# Patient Record
Sex: FEMALE | Race: OTHER | HISPANIC OR LATINO | URBAN - METROPOLITAN AREA
[De-identification: names, ages, dates, MRNs, and addresses within clinical notes are randomized per-mention and may not be internally consistent; named-entity substitution may affect disease eponyms.]

---

## 2017-02-01 ENCOUNTER — EMERGENCY (EMERGENCY)
Facility: HOSPITAL | Age: 22
LOS: 1 days | Discharge: HOME | End: 2017-02-01
Attending: EMERGENCY MEDICINE
Payer: SELF-PAY

## 2017-02-01 VITALS
TEMPERATURE: 98 F | SYSTOLIC BLOOD PRESSURE: 103 MMHG | WEIGHT: 104.94 LBS | HEIGHT: 60 IN | DIASTOLIC BLOOD PRESSURE: 60 MMHG | OXYGEN SATURATION: 100 % | HEART RATE: 82 BPM | RESPIRATION RATE: 17 BRPM

## 2017-02-01 DIAGNOSIS — S39.012A STRAIN OF MUSCLE, FASCIA AND TENDON OF LOWER BACK, INITIAL ENCOUNTER: ICD-10-CM
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
## 2017-02-01 DIAGNOSIS — R11.2 NAUSEA WITH VOMITING, UNSPECIFIED: ICD-10-CM

## 2017-02-01 PROCEDURE — 99283 EMERGENCY DEPT VISIT LOW MDM: CPT

## 2017-02-01 RX ORDER — IBUPROFEN 200 MG
600 TABLET ORAL ONCE
Qty: 0 | Refills: 0 | Status: COMPLETED | OUTPATIENT
Start: 2017-02-01 | End: 2017-02-01

## 2017-02-01 RX ORDER — IBUPROFEN 200 MG
1 TABLET ORAL
Qty: 20 | Refills: 0 | OUTPATIENT
Start: 2017-02-01 | End: 2017-02-06

## 2017-02-01 RX ORDER — METHOCARBAMOL 500 MG/1
1 TABLET, FILM COATED ORAL
Qty: 12 | Refills: 0 | OUTPATIENT
Start: 2017-02-01 | End: 2017-02-05

## 2017-02-01 RX ORDER — METHOCARBAMOL 500 MG/1
1500 TABLET, FILM COATED ORAL ONCE
Qty: 0 | Refills: 0 | Status: COMPLETED | OUTPATIENT
Start: 2017-02-01 | End: 2017-02-01

## 2017-02-01 RX ADMIN — Medication 600 MILLIGRAM(S): at 13:17

## 2017-02-01 RX ADMIN — METHOCARBAMOL 1500 MILLIGRAM(S): 500 TABLET, FILM COATED ORAL at 13:17
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
## 2017-02-01 NOTE — ED PROVIDER NOTE - NS ED MD SCRIBE ATTENDING SCRIBE SECTIONS
REVIEW OF SYSTEMS/DISPOSITION/VITAL SIGNS( Pullset)/HISTORY OF PRESENT ILLNESS/PAST MEDICAL/SURGICAL/SOCIAL HISTORY/HIV/PHYSICAL EXAM
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
## 2017-02-01 NOTE — ED PROVIDER NOTE - PHYSICAL EXAMINATION
Back is symmetrical, scapulae are symmetric. Neck has full range of motion. No spinal or paraspinal deformity or step-offs. All limbs equally strong 5+ bilaterally. Strong ankle dorsiflexion and plantar flexion against resistance bilaterally. Strong flexion/extension of big toe bilaterally. Pt is able to walk in straight line with steady gait. No recent weight loss or night sweats. No saddle anesthesia, no bowel/bladder incontinence or retention, no lower extremity weakness.   + R sided mid back latissimus dorsi point ttp.

## 2017-02-01 NOTE — ED PROVIDER NOTE - PROGRESS NOTE DETAILS
History and findings suggestive of muscle strain of the midback. Will give Rx for IBU and Robaxin. Patient will need to follow up with PMD. Care coordinator will assist. Pt is well appearing walking with steady gait, stable for discharge and follow up without fail with medical doctor. I had a detailed discussion with the patient and/or guardian regarding the historical points, exam findings, and any diagnostic results supporting the discharge diagnosis. Pt educated on care and need for follow up. Strict return instructions and red flag signs and symptoms discussed with patient. Questions answered. Pt shows understanding of discharge information and agrees to follow. The scribe's documentation has been prepared under my direction and personally reviewed by me in its entirety. I confirm that the note above actually reflects all work, treatment, procedures, and medical decision-making performed by me - ALANNA Medrano

## 2018-07-24 ENCOUNTER — EMERGENCY (EMERGENCY)
Facility: HOSPITAL | Age: 23
LOS: 1 days | Discharge: HOME | End: 2018-07-24
Attending: EMERGENCY MEDICINE | Admitting: EMERGENCY MEDICINE
Payer: MEDICAID

## 2018-07-24 VITALS
RESPIRATION RATE: 16 BRPM | TEMPERATURE: 98 F | SYSTOLIC BLOOD PRESSURE: 104 MMHG | OXYGEN SATURATION: 99 % | HEART RATE: 98 BPM | DIASTOLIC BLOOD PRESSURE: 62 MMHG

## 2018-07-24 LAB
ALBUMIN SERPL ELPH-MCNC: 3.9 G/DL — SIGNIFICANT CHANGE UP (ref 3.3–5)
ALP SERPL-CCNC: 66 U/L — SIGNIFICANT CHANGE UP (ref 40–120)
ALT FLD-CCNC: 20 U/L — SIGNIFICANT CHANGE UP (ref 4–33)
APTT BLD: 37 SEC — SIGNIFICANT CHANGE UP (ref 27.5–37.4)
AST SERPL-CCNC: 27 U/L — SIGNIFICANT CHANGE UP (ref 4–32)
B PERT DNA SPEC QL NAA+PROBE: SIGNIFICANT CHANGE UP
BASOPHILS # BLD AUTO: 0.01 K/UL — SIGNIFICANT CHANGE UP (ref 0–0.2)
BASOPHILS # BLD AUTO: 0.01 K/UL — SIGNIFICANT CHANGE UP (ref 0–0.2)
BASOPHILS NFR BLD AUTO: 0.3 % — SIGNIFICANT CHANGE UP (ref 0–2)
BASOPHILS NFR BLD AUTO: 0.5 % — SIGNIFICANT CHANGE UP (ref 0–2)
BASOPHILS NFR SPEC: 0 % — SIGNIFICANT CHANGE UP (ref 0–2)
BILIRUB SERPL-MCNC: 0.2 MG/DL — SIGNIFICANT CHANGE UP (ref 0.2–1.2)
BLASTS # FLD: 0 % — SIGNIFICANT CHANGE UP (ref 0–0)
BUN SERPL-MCNC: 10 MG/DL — SIGNIFICANT CHANGE UP (ref 7–23)
C PNEUM DNA SPEC QL NAA+PROBE: NOT DETECTED — SIGNIFICANT CHANGE UP
CALCIUM SERPL-MCNC: 8.5 MG/DL — SIGNIFICANT CHANGE UP (ref 8.4–10.5)
CHLORIDE SERPL-SCNC: 103 MMOL/L — SIGNIFICANT CHANGE UP (ref 98–107)
CO2 SERPL-SCNC: 25 MMOL/L — SIGNIFICANT CHANGE UP (ref 22–31)
CREAT SERPL-MCNC: 0.66 MG/DL — SIGNIFICANT CHANGE UP (ref 0.5–1.3)
D DIMER BLD IA.RAPID-MCNC: 204 NG/ML — SIGNIFICANT CHANGE UP
EOSINOPHIL # BLD AUTO: 0.03 K/UL — SIGNIFICANT CHANGE UP (ref 0–0.5)
EOSINOPHIL # BLD AUTO: 0.05 K/UL — SIGNIFICANT CHANGE UP (ref 0–0.5)
EOSINOPHIL NFR BLD AUTO: 1.6 % — SIGNIFICANT CHANGE UP (ref 0–6)
EOSINOPHIL NFR BLD AUTO: 1.7 % — SIGNIFICANT CHANGE UP (ref 0–6)
EOSINOPHIL NFR FLD: 0 % — SIGNIFICANT CHANGE UP (ref 0–6)
FIBRINOGEN PPP-MCNC: 429 MG/DL — SIGNIFICANT CHANGE UP (ref 310–510)
FLUAV H1 2009 PAND RNA SPEC QL NAA+PROBE: NOT DETECTED — SIGNIFICANT CHANGE UP
FLUAV H1 RNA SPEC QL NAA+PROBE: NOT DETECTED — SIGNIFICANT CHANGE UP
FLUAV H3 RNA SPEC QL NAA+PROBE: NOT DETECTED — SIGNIFICANT CHANGE UP
FLUAV SUBTYP SPEC NAA+PROBE: SIGNIFICANT CHANGE UP
FLUBV RNA SPEC QL NAA+PROBE: NOT DETECTED — SIGNIFICANT CHANGE UP
GIANT PLATELETS BLD QL SMEAR: PRESENT — SIGNIFICANT CHANGE UP
GLUCOSE SERPL-MCNC: 87 MG/DL — SIGNIFICANT CHANGE UP (ref 70–99)
HADV DNA SPEC QL NAA+PROBE: NOT DETECTED — SIGNIFICANT CHANGE UP
HBA1C BLD-MCNC: 4.8 % — SIGNIFICANT CHANGE UP (ref 4–5.6)
HCOV 229E RNA SPEC QL NAA+PROBE: NOT DETECTED — SIGNIFICANT CHANGE UP
HCOV HKU1 RNA SPEC QL NAA+PROBE: NOT DETECTED — SIGNIFICANT CHANGE UP
HCOV NL63 RNA SPEC QL NAA+PROBE: NOT DETECTED — SIGNIFICANT CHANGE UP
HCOV OC43 RNA SPEC QL NAA+PROBE: NOT DETECTED — SIGNIFICANT CHANGE UP
HCT VFR BLD CALC: 36.3 % — SIGNIFICANT CHANGE UP (ref 34.5–45)
HCT VFR BLD CALC: 37.3 % — SIGNIFICANT CHANGE UP (ref 34.5–45)
HGB BLD-MCNC: 12.3 G/DL — SIGNIFICANT CHANGE UP (ref 11.5–15.5)
HGB BLD-MCNC: 12.8 G/DL — SIGNIFICANT CHANGE UP (ref 11.5–15.5)
HIV COMBO RESULT: SIGNIFICANT CHANGE UP
HIV1+2 AB SPEC QL: SIGNIFICANT CHANGE UP
HMPV RNA SPEC QL NAA+PROBE: NOT DETECTED — SIGNIFICANT CHANGE UP
HPIV1 RNA SPEC QL NAA+PROBE: NOT DETECTED — SIGNIFICANT CHANGE UP
HPIV2 RNA SPEC QL NAA+PROBE: NOT DETECTED — SIGNIFICANT CHANGE UP
HPIV3 RNA SPEC QL NAA+PROBE: NOT DETECTED — SIGNIFICANT CHANGE UP
HPIV4 RNA SPEC QL NAA+PROBE: NOT DETECTED — SIGNIFICANT CHANGE UP
IMM GRANULOCYTES # BLD AUTO: 0.01 # — SIGNIFICANT CHANGE UP
IMM GRANULOCYTES # BLD AUTO: 0.01 # — SIGNIFICANT CHANGE UP
IMM GRANULOCYTES NFR BLD AUTO: 0.3 % — SIGNIFICANT CHANGE UP (ref 0–1.5)
IMM GRANULOCYTES NFR BLD AUTO: 0.5 % — SIGNIFICANT CHANGE UP (ref 0–1.5)
INR BLD: 1.03 — SIGNIFICANT CHANGE UP (ref 0.88–1.17)
LDH SERPL L TO P-CCNC: 147 U/L — SIGNIFICANT CHANGE UP (ref 135–225)
LYMPHOCYTES # BLD AUTO: 0.38 K/UL — LOW (ref 1–3.3)
LYMPHOCYTES # BLD AUTO: 0.42 K/UL — LOW (ref 1–3.3)
LYMPHOCYTES # BLD AUTO: 12.8 % — LOW (ref 13–44)
LYMPHOCYTES # BLD AUTO: 23.1 % — SIGNIFICANT CHANGE UP (ref 13–44)
LYMPHOCYTES NFR SPEC AUTO: 6.1 % — LOW (ref 13–44)
M PNEUMO DNA SPEC QL NAA+PROBE: NOT DETECTED — SIGNIFICANT CHANGE UP
MACROCYTES BLD QL: SLIGHT — SIGNIFICANT CHANGE UP
MCHC RBC-ENTMCNC: 30.1 PG — SIGNIFICANT CHANGE UP (ref 27–34)
MCHC RBC-ENTMCNC: 30.6 PG — SIGNIFICANT CHANGE UP (ref 27–34)
MCHC RBC-ENTMCNC: 33.9 % — SIGNIFICANT CHANGE UP (ref 32–36)
MCHC RBC-ENTMCNC: 34.3 % — SIGNIFICANT CHANGE UP (ref 32–36)
MCV RBC AUTO: 88.8 FL — SIGNIFICANT CHANGE UP (ref 80–100)
MCV RBC AUTO: 89.2 FL — SIGNIFICANT CHANGE UP (ref 80–100)
METAMYELOCYTES # FLD: 0 % — SIGNIFICANT CHANGE UP (ref 0–1)
MICROCYTES BLD QL: SLIGHT — SIGNIFICANT CHANGE UP
MONOCYTES # BLD AUTO: 0.25 K/UL — SIGNIFICANT CHANGE UP (ref 0–0.9)
MONOCYTES # BLD AUTO: 0.46 K/UL — SIGNIFICANT CHANGE UP (ref 0–0.9)
MONOCYTES NFR BLD AUTO: 13.7 % — SIGNIFICANT CHANGE UP (ref 2–14)
MONOCYTES NFR BLD AUTO: 15.4 % — HIGH (ref 2–14)
MONOCYTES NFR BLD: 5.3 % — SIGNIFICANT CHANGE UP (ref 2–9)
MYELOCYTES NFR BLD: 0 % — SIGNIFICANT CHANGE UP (ref 0–0)
NEUTROPHIL AB SER-ACNC: 77.2 % — HIGH (ref 43–77)
NEUTROPHILS # BLD AUTO: 1.1 K/UL — LOW (ref 1.8–7.4)
NEUTROPHILS # BLD AUTO: 2.07 K/UL — SIGNIFICANT CHANGE UP (ref 1.8–7.4)
NEUTROPHILS NFR BLD AUTO: 60.6 % — SIGNIFICANT CHANGE UP (ref 43–77)
NEUTROPHILS NFR BLD AUTO: 69.5 % — SIGNIFICANT CHANGE UP (ref 43–77)
NEUTS BAND # BLD: 3.5 % — SIGNIFICANT CHANGE UP (ref 0–6)
NRBC # FLD: 0 — SIGNIFICANT CHANGE UP
NRBC # FLD: 0 — SIGNIFICANT CHANGE UP
OTHER - HEMATOLOGY %: 0 — SIGNIFICANT CHANGE UP
PLATELET # BLD AUTO: 86 K/UL — LOW (ref 150–400)
PLATELET # BLD AUTO: 93 K/UL — LOW (ref 150–400)
PLATELET COUNT - ESTIMATE: SIGNIFICANT CHANGE UP
PMV BLD: 10.6 FL — SIGNIFICANT CHANGE UP (ref 7–13)
PMV BLD: 10.8 FL — SIGNIFICANT CHANGE UP (ref 7–13)
POTASSIUM SERPL-MCNC: 3.9 MMOL/L — SIGNIFICANT CHANGE UP (ref 3.5–5.3)
POTASSIUM SERPL-SCNC: 3.9 MMOL/L — SIGNIFICANT CHANGE UP (ref 3.5–5.3)
PROMYELOCYTES # FLD: 0 % — SIGNIFICANT CHANGE UP (ref 0–0)
PROT SERPL-MCNC: 6.9 G/DL — SIGNIFICANT CHANGE UP (ref 6–8.3)
PROTHROM AB SERPL-ACNC: 11.9 SEC — SIGNIFICANT CHANGE UP (ref 9.8–13.1)
RBC # BLD: 4.09 M/UL — SIGNIFICANT CHANGE UP (ref 3.8–5.2)
RBC # BLD: 4.18 M/UL — SIGNIFICANT CHANGE UP (ref 3.8–5.2)
RBC # FLD: 11.7 % — SIGNIFICANT CHANGE UP (ref 10.3–14.5)
RBC # FLD: 11.7 % — SIGNIFICANT CHANGE UP (ref 10.3–14.5)
RSV RNA SPEC QL NAA+PROBE: NOT DETECTED — SIGNIFICANT CHANGE UP
RV+EV RNA SPEC QL NAA+PROBE: NOT DETECTED — SIGNIFICANT CHANGE UP
SMUDGE CELLS # BLD: PRESENT — SIGNIFICANT CHANGE UP
SODIUM SERPL-SCNC: 139 MMOL/L — SIGNIFICANT CHANGE UP (ref 135–145)
VARIANT LYMPHS # BLD: 6.1 % — SIGNIFICANT CHANGE UP
WBC # BLD: 1.82 K/UL — LOW (ref 3.8–10.5)
WBC # BLD: 2.98 K/UL — LOW (ref 3.8–10.5)
WBC # FLD AUTO: 1.82 K/UL — LOW (ref 3.8–10.5)
WBC # FLD AUTO: 2.98 K/UL — LOW (ref 3.8–10.5)

## 2018-07-24 PROCEDURE — 99283 EMERGENCY DEPT VISIT LOW MDM: CPT | Mod: GC

## 2018-07-24 PROCEDURE — 99219: CPT

## 2018-07-24 RX ORDER — ACETAMINOPHEN 500 MG
975 TABLET ORAL ONCE
Qty: 0 | Refills: 0 | Status: COMPLETED | OUTPATIENT
Start: 2018-07-24 | End: 2018-07-24

## 2018-07-24 RX ORDER — SODIUM CHLORIDE 9 MG/ML
1000 INJECTION INTRAMUSCULAR; INTRAVENOUS; SUBCUTANEOUS
Qty: 0 | Refills: 0 | Status: DISCONTINUED | OUTPATIENT
Start: 2018-07-24 | End: 2018-07-28

## 2018-07-24 RX ORDER — METOCLOPRAMIDE HCL 10 MG
10 TABLET ORAL ONCE
Qty: 0 | Refills: 0 | Status: COMPLETED | OUTPATIENT
Start: 2018-07-24 | End: 2018-07-24

## 2018-07-24 RX ORDER — KETOROLAC TROMETHAMINE 30 MG/ML
15 SYRINGE (ML) INJECTION ONCE
Qty: 0 | Refills: 0 | Status: DISCONTINUED | OUTPATIENT
Start: 2018-07-24 | End: 2018-07-24

## 2018-07-24 RX ORDER — ACETAMINOPHEN 500 MG
650 TABLET ORAL ONCE
Qty: 0 | Refills: 0 | Status: COMPLETED | OUTPATIENT
Start: 2018-07-24 | End: 2018-07-24

## 2018-07-24 RX ORDER — SODIUM CHLORIDE 9 MG/ML
1000 INJECTION INTRAMUSCULAR; INTRAVENOUS; SUBCUTANEOUS ONCE
Qty: 0 | Refills: 0 | Status: COMPLETED | OUTPATIENT
Start: 2018-07-24 | End: 2018-07-24

## 2018-07-24 RX ADMIN — Medication 10 MILLIGRAM(S): at 06:51

## 2018-07-24 RX ADMIN — SODIUM CHLORIDE 2000 MILLILITER(S): 9 INJECTION INTRAMUSCULAR; INTRAVENOUS; SUBCUTANEOUS at 06:51

## 2018-07-24 RX ADMIN — SODIUM CHLORIDE 125 MILLILITER(S): 9 INJECTION INTRAMUSCULAR; INTRAVENOUS; SUBCUTANEOUS at 23:59

## 2018-07-24 RX ADMIN — Medication 650 MILLIGRAM(S): at 17:21

## 2018-07-24 RX ADMIN — Medication 15 MILLIGRAM(S): at 07:09

## 2018-07-24 RX ADMIN — Medication 15 MILLIGRAM(S): at 09:44

## 2018-07-24 RX ADMIN — Medication 100 MILLIGRAM(S): at 15:36

## 2018-07-24 RX ADMIN — Medication 650 MILLIGRAM(S): at 18:07

## 2018-07-24 RX ADMIN — Medication 975 MILLIGRAM(S): at 06:51

## 2018-07-24 NOTE — CONSULT NOTE ADULT - SUBJECTIVE AND OBJECTIVE BOX
Pt is 23 YO F who presents with 2 days of intermittent right sided throbbing headache, neck pain, and fever/chills in the setting of insect bite 3 days ago, found to have leukopenia and thrombocytopenia.    HPI: Pt is 23 YO F with PMHx of Coxiella pneumonia at age 2 requiring 1 week hospital admission, Hep A at age 10 requiring 1 week hospital admission, and episode of vertigo at age 15 requiring 3 day hospital admission, who presents with 2 days of intermittent throbbing right sided frontal headaches that awake her at night associated with intermittent neck pain  and fever/chills in the setting of insect bite 3 days ago. Pt states that there are a lot of mosquitos at work and 3 days ago she noticed an insect bite on her leg  that was pruritic but not painful, she did not see the insect that bit her. She then started developing intermittent right sided frontal headaches associated with neck pain and fever/chills that would wake her from sleep. Her mother noticed that her head felt warm and brought her to the ED. The pt denies photophobia, change in vision or hearing, sore throat, runny nose, sore throat, cough, dizziness myalgia, arthralgia, nausea, vomiting, diarrhea, anorexia, recent travel, sick contacts,  rash other than the insect bite, shortness of breath, chest pain, abd pain.   In the ED, pts headache improved, denies active neck pain or fever/chills. She is afebrile TMax=98, and hemodynamically stable JM=515/62, HR=98, R=16, and 99% sat on RA. She appears well, non-toxic. There is a single papule on an erythematous base that is no painful and no longer pruritic, without evidence of pus or cellulitis.   Labs showed WBC = 2.93, and platelets=93   with Leukopenia and Thrombocytopenia in the setting of recent insect bite.       PAST MEDICAL & SURGICAL HISTORY:  Coxiella at age 2 per mother  Hep A at age 10  Vertigo at age 15    No significant past surgical history    No Known Drug Allergies    Social Hx  Lives at home with mom  she denies etoh, smoking cigarettes, drug use  she is sexually active with men, on birth control with non-consistent condom use. States that she was last tested for STI and HIV in 10/17, all were negative  she works as a  and attends school  No recent travel or sick contacts    FAMILY HISTORY:  No pertinent family history in first degree relatives        ROS      General:  +fever/chills    Skin: Insect bite on right medial shin    HEENT: negative except as above    Respiratory and Thorax: denies shortness of breath or cough    Cardiovascular: denies chest pain	    Gastrointestinal: denies nausea, vomiting, abd pain, 	    Genitourinary: denies dysuria, urgency, frequency    Musculoskeletal:	 denies arthralgia or myalgias    Neurological:	denies focal weakness or change in sensation    Psych: Normal affect  	  PHYSICAL EXAM:    Vital Signs Last 24 Hrs  T(C): 36.7 (24 Jul 2018 11:21), Max: 36.9 (24 Jul 2018 05:13)  T(F): 98.1 (24 Jul 2018 11:21), Max: 98.5 (24 Jul 2018 05:13)  HR: 90 (24 Jul 2018 11:21) (90 - 98)  BP: 91/53 (24 Jul 2018 11:21) (91/53 - 104/62)  BP(mean): --  RR: 14 (24 Jul 2018 11:21) (14 - 16)  SpO2: 98% (24 Jul 2018 11:21) (98% - 99%)    GEN: AAO3, NAD    HEENT: Denies pain on palpation of right temporal region. NC/AT, EOMI    Neck: supple, no LAD    CHEST/Respiratory: CTAB, no wheezing rales or rhonchi    Cardiovascular: RRR, nml S1, S2    Gastrointestinal: +bs, abd soft, non-tender, non-distened    Extremities: no edema    Neurological: strength 5/5 in all 4 extremities, no change in sensation, CNII-XII grossly intact.    Skin: papule on erythematous base that is non-tender, not warm.    Lymph Nodes: NO  LAD    Musculoskeletal: full ROM      LABS/DIAGNOSTIC TESTS                       12.3   1.82  )-----------( 86       ( 24 Jul 2018 11:15 )             36.3       WBC Count: 1.82 K/uL (07-24 @ 11:15)  WBC Count: 2.98 K/uL (07-24 @ 06:35)      07-24    139  |  103  |  10  ----------------------------<  87  3.9   |  25  |  0.66    Ca    8.5          TPro  6.9  /  Alb  3.9  /  TBili  0.2  /  DBili  x   /  AST  27  /  ALT  20  /  AlkPhos  66  07-24    PT: 11.9 SEC;   INR: 1.03    LDH:147

## 2018-07-24 NOTE — CONSULT NOTE ADULT - ASSESSMENT
21 yo F with no PMH p/w R frontal headaches x 2 days, found to have leukopenia and thrombocytopenia for which Hematology is consulted.    1. 21 yo F with no PMH p/w R frontal headaches x 2 days, found to have leukopenia and thrombocytopenia for which Hematology is consulted.    1. Leukopenia/thrombocytopenia: new onset, per pt had blood work in Mar/April of this year and was normal at that time. Likely due to infectious etiology/viral syndrome, no splenomegaly or enlarged lymph nodes on exam  - peripheral blood smear reviewed: normocytic normochromic RBCs, no RBC fragments, occasional atypical lymphocyte surrounded by RBCs which can be seen in various non-neoplastic conditions classically in infectious mononucleosis, CMV, viral hepatitis.   - check HIV, hepatitis panel, EBV serologies   - c/w supportive care, tylenol prn headaches, would avoid NSAIDS given thrombocytopenia  - also evaluated by infectious disease and started on doxycycline for ? tick borne illness given recent bug bite, AST/ALT normal.   - would expect improvement in leukopenia and thrombocytopenia with resolution of infection. She should have repeat CBC with her PCP later this week or by early next week the latest. If counts continue to downtrend then may need further evaluation and referral to Hematology.  - Discussed above plan at length with patient, her mother, and rest of family at bedside    Plan d/w primary team    Damir Friedman, PGY-5  Hematology-Oncology Fellow  Pager: [phone] ([facility]), [phone] ([facility]) 23 yo F with no PMH p/w R frontal headaches x 2 days, found to have leukopenia and thrombocytopenia for which Hematology is consulted.    1. Leukopenia/thrombocytopenia: new onset, per pt had blood work in Mar/April of this year and was normal at that time. Likely due to infectious etiology/viral syndrome, no splenomegaly or enlarged lymph nodes on exam  - peripheral blood smear reviewed: normocytic normochromic RBCs, no RBC fragments, occasional atypical lymphocyte surrounded by RBCs which can be seen in various non-neoplastic conditions classically in infectious mononucleosis, CMV, viral hepatitis. No platelet clumping noted.   - check HIV, hepatitis panel, EBV serologies   - c/w supportive care, tylenol prn headaches, would avoid NSAIDS given thrombocytopenia  - also evaluated by infectious disease and started on doxycycline for ? tick borne illness given recent bug bite, AST/ALT normal.   - would expect improvement in leukopenia and thrombocytopenia with resolution of infection. She should have repeat CBC with her PCP later this week or by early next week the latest. If counts continue to downtrend then may need further evaluation and referral to Hematology.  - Discussed above plan at length with patient, her mother, and rest of family at bedside    Plan d/w primary team    Damir Friedman, PGY-5  Hematology-Oncology Fellow  Pager: [phone] ([facility]), [phone] ([facility])

## 2018-07-24 NOTE — ED CDU PROVIDER INITIAL DAY NOTE - OBJECTIVE STATEMENT
22F with no PMH coming in with 2 days of R temporal HA, gradual onset, no N/V, vision changes, numbness, weakness, or syncope.  + subjective warmth.  No photophobia or neck stiffness.  No recent travel or time in the woods.  On screening labs, she was found to be leukopenic and thrombocytopenic with high monocytes.  ID and heme consulted, recs are pending.  Sent coags, fibrinogen, LDH.  Patient's HA has resolved after receiving tylenol, toradol, reglan.  Upreg neg.  On exam, HDS, NAD, lungs CTAB, heart sounds normal, abd benign, LEs without edema, AAOx3, PERRLA, EOMIB, CN 2-12 intact, 5/5 strength, SILT, no drift.  Will admit to CDU for monitoring, repeat labs, heme and ID consults. 22F with no PMH coming in with 2 days of R frontal HA, gradual onset, no N/V, vision changes, numbness, weakness, or syncope.  + subjective warmth.  No photophobia or neck stiffness.  No diarrhea, urinary sx, cough, rhinorrhea.  No recent travel or time in the woods.  On screening labs, she was found to be leukopenic and thrombocytopenic with high monocytes.  ID and heme consulted, recs are pending.  Sent coags, fibrinogen, LDH.  Patient's HA has resolved after receiving tylenol, toradol, reglan.  Upreg neg.  On exam, HDS, NAD, lungs CTAB, heart sounds normal, abd benign, LEs without edema, AAOx3, PERRLA, EOMIB, CN 2-12 intact, 5/5 strength, SILT, no drift, neck supple, no photophobia.  Will admit to CDU for monitoring, repeat labs, heme and ID consults.

## 2018-07-24 NOTE — ED PROVIDER NOTE - PROGRESS NOTE DETAILS
Patient denies any HA or other symptoms currently. Neuro exam completed and CN II-XII are grossly intact. ID consulted for low platelets and low WBC levels. Pt well appearing, no neck stiffness- lower ext bug bite without cellulitis, normal neuro exam, subjective fever at home but no measured temp, for possible CDU to monitor symptoms and CBC, and for ID and possible other consultation based on symptoms and exam ID consulted for CDU, and put in a page for hematology consult as well

## 2018-07-24 NOTE — ED PROVIDER NOTE - ATTENDING CONTRIBUTION TO CARE
[place]  pt with 2 days of rt sided HA  pressure and throbbing  no assoc vomiting or photophobia  HA   began after eating  and became progressively worse gradually   partial relief with NSAID  Mother brought her in because she felt her head and it felt warm  (no temperature taken)  Pt gets b/l throbbing HA infrequently   again w/o assoc sxs      exam  pt in no visible distress  neck supple  optic discs sharp  no pronator drift  nl strength  sensation to touch and cerebellar  CN  nl  lungs clear  card RRR S1S2      plan  treat  pain in ED  HA not abrupt onset  no assoc sxs  exam normal [place]  pt with 2 days of rt sided HA  pressure and throbbing  no assoc vomiting or photophobia  HA   began after eating  and became progressively worse gradually   partial relief with NSAID  Mother brought her in because she felt her head and it felt warm  (no temperature taken)  Pt gets b/l throbbing HA infrequently   again w/o assoc sxs   pt does note mild bodyachealso notes antecedent insect bites about 2 days prior  evidence of bite on RLE    exam  pt in no visible distress  neck supple  optic discs sharp  no pronator drift  nl strength  sensation to touch and cerebellar  CN  nl  lungs clear  card RRR S1S2      labs remarkable for leukopenia, thrombocytopenia  elevated monocytes   no story of tick bite  ?mosquito-  suggests viral infection  will consult ID

## 2018-07-24 NOTE — ED ADULT TRIAGE NOTE - CHIEF COMPLAINT QUOTE
pt c/o right sided HA since Sunday, mostly constant, behind the right eye. pt took Aleve at 3am PTA, with some relief. pt denies N/V/Dizziness/Vision changes. no known PMH.

## 2018-07-24 NOTE — ED CDU PROVIDER INITIAL DAY NOTE - PHYSICAL EXAMINATION
On exam, HDS, NAD, lungs CTAB, heart sounds normal, abd soft, NT, ND, no CVAT, LEs without edema, AAOx3, PERRLA, EOMIB, CN 2-12 intact, 5/5 strength, SILT, no drift, neck supple, no photophobia.

## 2018-07-24 NOTE — ED CDU PROVIDER INITIAL DAY NOTE - ATTENDING CONTRIBUTION TO CARE
I, Jennifer Cabot, MD, have performed a history and physical exam of the patient and discussed their management with the ACP.  I reviewed the PA's note and agree with the documented findings and plan of care. My medical decision making and observations are found above.    Cabot: 22F coming in with 2 days of R temporal HA, gradual onset, no N/V, vision changes, numbness, weakness, or syncope.  + subjective warmth.  No photophobia or neck stiffness.  No recent travel or time in the [place].  On screening labs, she was found to be leukopenic and thrombocytopenic with high monocytes.  ID and heme consulted, recs are pending.  Sent coags, fibrinogen, LDH.  Patient's HA has resolved after receiving tylenol, toradol, reglan.  Upreg neg.  On exam, HDS, NAD, lungs CTAB, heart sounds normal, abd benign, LEs without edema, AAOx3, PERRLA, EOMIB, CN 2-12 intact, 5/5 strength, SILT, no drift, neck supple, no photophobia.  Will admit to CDU for monitoring, repeat labs, heme and ID consults. I, Jennifer Cabot, MD, have performed a history and physical exam of the patient and discussed their management with the ACP.  I reviewed the PA's note and agree with the documented findings and plan of care. My medical decision making and observations are found above.    22F with no PMH coming in with 2 days of R frontal HA, gradual onset, no N/V, vision changes, numbness, weakness, or syncope.  + subjective warmth.  No photophobia or neck stiffness.  No diarrhea, urinary sx, cough, rhinorrhea.  No recent travel or time in the woods.  On screening labs, she was found to be leukopenic and thrombocytopenic with high monocytes.  ID and heme consulted, recs are pending.  Sent coags, fibrinogen, LDH.  Patient's HA has resolved after receiving tylenol, toradol, reglan.  Upreg neg.  On exam, HDS, NAD, lungs CTAB, heart sounds normal, abd benign, LEs without edema, AAOx3, PERRLA, EOMIB, CN 2-12 intact, 5/5 strength, SILT, no drift, neck supple, no photophobia.  Will admit to CDU for monitoring, repeat labs, heme and ID consults.

## 2018-07-24 NOTE — CONSULT NOTE ADULT - SUBJECTIVE AND OBJECTIVE BOX
HPI: 21 yo F with no PMH p/w R frontal headaches x 2 days, found to have leukopenia and thrombocytopenia for which Hematology is consulted.    She reports acute onset headaches for past 2 days, located in R frontal area, non-radiating, constant, 8/10, alleviated by tylenol and aleve. Reports no prior history of headaches or migraines. No visual changes, no light sensitivity, no neck stiffness. No numbness, weakness, or paresthesias. Currently improved after receiving reglan, toradol, and tylenol.    Reports 3 weeks ago, she has URI symptoms with cough and nasal congestion, + subjective fevers/chills. Also noted enlarged lymph node in R axilla, about dime size a few days ago, was tender, and now resolved. No weight loss or night sweats. No recent travel or sick contacts. Had a bug bite on her R leg a couple of days ago, likely a mosquito bite. Denies any easy bruising or bleeding currently. Reports history of frequent nose bleeds (about 3-4 times a month) but never had to go to the hospital for uncontrolled epistaxis. No gum bleeding, no prior history of any surgeries or dental procedures. No nausea/vomiting/abdominal pain/diarrhea.      On presentation found to have WBC of 2.98, Hgb of 12.8, and Platelets of 93k. Reports that she had blood work done at her PCP's office in Feb/Mar of this year and was told everything was normal.     PAST MEDICAL & SURGICAL HISTORY:  No pertinent past medical history  No significant past surgical history    Allergies    No Known Allergies    Intolerances    MEDICATIONS  (STANDING):    MEDICATIONS  (PRN):    FAMILY HISTORY:  No pertinent family history in first degree relatives    SOCIAL HISTORY: No EtOH, no tobacco. Is currently in school and also works.     REVIEW OF SYSTEMS: see HPI  All other review of systems is negative unless indicated above    VITALS:   T(F): 98.1 (07-24-18 @ 11:21), Max: 98.5 (07-24-18 @ 05:13)  HR: 90 (07-24-18 @ 11:21)  BP: 91/53 (07-24-18 @ 11:21)  RR: 14 (07-24-18 @ 11:21)  SpO2: 98% (07-24-18 @ 11:21)  Wt(kg): --    PHYSICAL EXAM:  GENERAL: resting in bed comfortably  EYES: EOMI, conjunctiva and sclera clear  NECK: supple  RESP: CTAB  HEART: RRR, S1/S2  ABDOMEN: +BS, soft, NT, ND  EXTREMITIES: no LE edema  NEUROLOGIC: no focal deficits, AAO x 3  SKIN: warm and dry, no rashes, RLE small <1cm erythematous lesion consistent with insect bite, no warmth or tenderness, no drainage.  LYMPH: No peripheral lymphadenopathy appreciated    LABS:                         12.3   1.82  )-----------( 86       ( 24 Jul 2018 11:15 )             36.3   07-24    139  |  103  |  10  ----------------------------<  87  3.9   |  25  |  0.66    Ca    8.5      24 Jul 2018 06:35    TPro  6.9  /  Alb  3.9  /  TBili  0.2  /  DBili  x   /  AST  27  /  ALT  20  /  AlkPhos  66  07-24  Lactate Dehydrogenase, Serum: 147 U/L (07-24 @ 11:00)  PT/INR - ( 24 Jul 2018 11:00 )   PT: 11.9 SEC;   INR: 1.03     PTT - ( 24 Jul 2018 11:00 )  PTT:37.0 SEC    IMAGING: HPI: 23 yo F with no PMH p/w R frontal headaches x 2 days, found to have leukopenia and thrombocytopenia for which Hematology is consulted.    She reports acute onset headaches for past 2 days, located in R frontal area, non-radiating, constant, 8/10, alleviated by tylenol and aleve. Reports no prior history of headaches or migraines. No visual changes, no light sensitivity, no neck stiffness. No numbness, weakness, or paresthesias. Currently improved after receiving reglan, toradol, and tylenol.    Reports 3 weeks ago, she has URI symptoms with cough and nasal congestion, + subjective fevers/chills. Also noted enlarged lymph node in R axilla, about dime size a few days ago, was tender, and now resolved. No weight loss or night sweats. No recent travel or sick contacts. Had a bug bite on her R leg a couple of days ago, likely a mosquito bite. Denies any easy bruising or bleeding currently. Reports history of frequent nose bleeds (about 3-4 times a month) but never had to go to the hospital for uncontrolled epistaxis. No gum bleeding, no prior history of any surgeries or dental procedures. No nausea/vomiting/abdominal pain/diarrhea.      On presentation found to have WBC of 2.98, Hgb of 12.8, and Platelets of 93k. Reports that she had blood work done at her PCP's office in Feb/Mar of this year and was told everything was normal. She is not on any medications and denies taking any herbal supplements.     PAST MEDICAL & SURGICAL HISTORY:  No pertinent past medical history  No significant past surgical history    Allergies    No Known Allergies    Intolerances    MEDICATIONS  (STANDING):    MEDICATIONS  (PRN):    FAMILY HISTORY:  No pertinent family history in first degree relatives    SOCIAL HISTORY: No EtOH, no tobacco. Is currently in school and also works.     REVIEW OF SYSTEMS: see HPI  All other review of systems is negative unless indicated above    VITALS:   T(F): 98.1 (07-24-18 @ 11:21), Max: 98.5 (07-24-18 @ 05:13)  HR: 90 (07-24-18 @ 11:21)  BP: 91/53 (07-24-18 @ 11:21)  RR: 14 (07-24-18 @ 11:21)  SpO2: 98% (07-24-18 @ 11:21)  Wt(kg): --    PHYSICAL EXAM:  GENERAL: resting in bed comfortably  EYES: EOMI, conjunctiva and sclera clear  NECK: supple  RESP: CTAB  HEART: RRR, S1/S2  ABDOMEN: +BS, soft, NT, ND  EXTREMITIES: no LE edema  NEUROLOGIC: no focal deficits, AAO x 3  SKIN: warm and dry, no rashes, RLE small <1cm erythematous lesion consistent with insect bite, no warmth or tenderness, no drainage.  LYMPH: No peripheral lymphadenopathy appreciated    LABS:                         12.3   1.82  )-----------( 86       ( 24 Jul 2018 11:15 )             36.3   07-24    139  |  103  |  10  ----------------------------<  87  3.9   |  25  |  0.66    Ca    8.5      24 Jul 2018 06:35    TPro  6.9  /  Alb  3.9  /  TBili  0.2  /  DBili  x   /  AST  27  /  ALT  20  /  AlkPhos  66  07-24  Lactate Dehydrogenase, Serum: 147 U/L (07-24 @ 11:00)  PT/INR - ( 24 Jul 2018 11:00 )   PT: 11.9 SEC;   INR: 1.03     PTT - ( 24 Jul 2018 11:00 )  PTT:37.0 SEC    IMAGING: HPI: 23 yo F with no PMH p/w R frontal headaches x 2 days, found to have leukopenia and thrombocytopenia for which Hematology is consulted.    She reports acute onset headaches for past 2 days, located in R frontal area, non-radiating, constant, 8/10, alleviated by tylenol and aleve. Reports no prior history of headaches or migraines. No visual changes, no light sensitivity, no neck stiffness. No numbness, weakness, or paresthesias. Currently improved after receiving reglan, toradol, and tylenol.    Reports 3 weeks ago, she has URI symptoms with cough and nasal congestion, + subjective fevers/chills. Also noted enlarged lymph node in R axilla, about dime size a few days ago, was tender, and now resolved. No weight loss or night sweats. No recent travel or sick contacts. Had a bug bite on her R leg a couple of days ago, likely a mosquito bite. Denies any easy bruising or bleeding currently. Reports history of frequent nose bleeds (about 3-4 times a month) but never had to go to the hospital for uncontrolled epistaxis. No gum bleeding, no prior history of any surgeries or dental procedures. No nausea/vomiting/abdominal pain/diarrhea.      On presentation found to have WBC of 2.98, Hgb of 12.8, and Platelets of 93k. Reports that she had blood work done at her PCP's office in Feb/Mar of this year and was told everything was normal. She is not on any medications and denies taking any herbal supplements.     PAST MEDICAL & SURGICAL HISTORY:  No pertinent past medical history  No significant past surgical history    Allergies    No Known Allergies    Intolerances    MEDICATIONS  (STANDING):    MEDICATIONS  (PRN):    FAMILY HISTORY:  No pertinent family history in first degree relatives    SOCIAL HISTORY: No EtOH, no tobacco. Is currently in school and also works.     REVIEW OF SYSTEMS: see HPI  All other review of systems is negative unless indicated above    VITALS:   T(F): 98.1 (07-24-18 @ 11:21), Max: 98.5 (07-24-18 @ 05:13)  HR: 90 (07-24-18 @ 11:21)  BP: 91/53 (07-24-18 @ 11:21)  RR: 14 (07-24-18 @ 11:21)  SpO2: 98% (07-24-18 @ 11:21)  Wt(kg): --    PHYSICAL EXAM:  GENERAL: resting in bed comfortably  EYES: EOMI, conjunctiva and sclera clear  NECK: supple  RESP: CTAB  HEART: RRR, S1/S2  ABDOMEN: +BS, soft, NT, ND, no hepatosplenomegaly  EXTREMITIES: no LE edema  NEUROLOGIC: no focal deficits, AAO x 3  SKIN: warm and dry, no rashes, RLE small <1cm erythematous lesion consistent with insect bite, no warmth or tenderness, no drainage.  LYMPH: No peripheral lymphadenopathy appreciated    LABS:                         12.3   1.82  )-----------( 86       ( 24 Jul 2018 11:15 )             36.3   07-24    139  |  103  |  10  ----------------------------<  87  3.9   |  25  |  0.66    Ca    8.5      24 Jul 2018 06:35    TPro  6.9  /  Alb  3.9  /  TBili  0.2  /  DBili  x   /  AST  27  /  ALT  20  /  AlkPhos  66  07-24  Lactate Dehydrogenase, Serum: 147 U/L (07-24 @ 11:00)  PT/INR - ( 24 Jul 2018 11:00 )   PT: 11.9 SEC;   INR: 1.03     PTT - ( 24 Jul 2018 11:00 )  PTT:37.0 SEC    IMAGING:

## 2018-07-24 NOTE — CONSULT NOTE ADULT - ATTENDING COMMENTS
Patient lives in [place] and works in [employer]. Denies travel or known ill contacts.  Appears well,  Fever, leukopenia, thrombocytopenia with headache - now improved concerning for Anaplasmosis.  She may have viral syndrome    Suggest:  Doxycycline  HIV viral Load  EBV serologies  RVP
Ms. Cole was seen in consultation today by the hematology service. She presented with some headaches and muscle aches. She's had some low-grade fevers. She denies any sore throat. She was found to have a low white blood cell count and low platelet count. She did note an axillary lymph node that was somewhat tender yesterday, but it is not present on physical examination today.    The peripheral blood smear was reviewed. There are decreased white blood cells. The platelets are present are well granulated and somewhat larger than normal in size. The red cells appeared normal. There were a few atypical lymphocytes with flowing cytoplasm around adjacent red blood cells, these are typically seen in viral infections such as infectious mononucleosis amongst others. Her respiratory virus panel was negative. She is being followed by infectious diseases as well. We have no specific recommendations, as this is likely a self-limited viral infection. The family was given reassurance in regards to this, and her white blood cell count and platelet count to be followed to recovery. I agree with the assessment and plan as stated above and Dr. Friedman's note.

## 2018-07-24 NOTE — ED CDU PROVIDER INITIAL DAY NOTE - MEDICAL DECISION MAKING DETAILS
[place]ot: 22F coming in with 2 days of R temporal HA, gradual onset, no N/V, vision changes, numbness, weakness, or syncope.  + subjective warmth.  No photophobia or neck stiffness.  No recent travel or time in the woods.  On screening labs, she was found to be leukopenic and thrombocytopenic with high monocytes.  ID and heme consulted, recs are pending.  Sent coags, fibrinogen, LDH.  Patient's HA has resolved after receiving tylenol, toradol, reglan.  Upreg neg.  On exam, HDS, NAD, lungs CTAB, heart sounds normal, abd benign, LEs without edema, AAOx3, PERRLA, EOMIB, CN 2-12 intact, 5/5 strength, SILT, no drift.  Will admit to CDU for monitoring, repeat labs, heme and ID consults. 22F with no PMH coming in with 2 days of R frontal HA, gradual onset, no N/V, vision changes, numbness, weakness, or syncope.  + subjective warmth.  No photophobia or neck stiffness.  No diarrhea, urinary sx, cough, rhinorrhea.  No recent travel or time in the woods.  On screening labs, she was found to be leukopenic and thrombocytopenic with high monocytes.  ID and heme consulted, recs are pending.  Sent coags, fibrinogen, LDH.  Patient's HA has resolved after receiving tylenol, toradol, reglan.  Upreg neg.  On exam, HDS, NAD, lungs CTAB, heart sounds normal, abd benign, LEs without edema, AAOx3, PERRLA, EOMIB, CN 2-12 intact, 5/5 strength, SILT, no drift, neck supple, no photophobia.  Will admit to CDU for monitoring, repeat labs, heme and ID consults.

## 2018-07-24 NOTE — ED PROVIDER NOTE - OBJECTIVE STATEMENT
22F with no pmh presenting with right frontal headache x2 days. Patient states mild at onset, gradually worsened and plateaued. Described as a pressure/throbbing sensation, constant. Tried taking advil twice without relief of symptoms. States had headaches in the past however, never of this quality. Endorses headaches may be worse at night. Denies fever, chills, cp, sob, n/v/d, dizziness, cough, rhinorrhea, sore throat, recent travel

## 2018-07-24 NOTE — CONSULT NOTE ADULT - ASSESSMENT
21 YO F with intermittent right sided headaches, neck pain, fever/chills for 2 days after being bitten by an insect, found to have leukopenia and thrombocytopenia.     Pt appears well, being worked up for leukopenia and thrombocytopenia    #Leukopenia/thromboctyopenia- likely 2/2 virus vs parasite vs other infectious etiology vs hematologic vs rheumatologic vs other   -Start Doxycyline 100 mg PO BID  -follow up blood smear  -heme following  -trend cbc  -monitor vs and mental status.

## 2018-07-24 NOTE — ED PROVIDER NOTE - MEDICAL DECISION MAKING DETAILS
22F presenting with headache x2 days. No signs of meningits, no fever, no meningismus, no photophobia. No focal neurological deficits on exam. Likely benign headache, migraine vs undifferentiated benign headache

## 2018-07-24 NOTE — ED CDU PROVIDER INITIAL DAY NOTE - PROGRESS NOTE DETAILS
[place]ot: 22F coming in with 2 days of R temporal HA, gradual onset, no N/V, vision changes, numbness, weakness, or syncope.  + subjective warmth.  No photophobia or neck stiffness.  No recent travel or time in the woods.  On screening labs, she was found to be leukopenic and thrombocytopenic with high monocytes.  ID and heme consulted, recs are pending.  Sent coags, fibrinogen, LDH.  Patient's HA has resolved after receiving tylenol, toradol, reglan.  Upreg neg.  On exam, HDS, NAD, lungs CTAB, heart sounds normal, abd benign, LEs without edema, AAOx3, PERRLA, EOMIB, CN 2-12 intact, 5/5 strength, SILT, no drift, neck supple, no photophobia.  Will admit to CDU for monitoring, repeat labs, heme and ID consults. [place]: 22F with no PMH coming in with 2 days of R frontal HA, gradual onset, no N/V, vision changes, numbness, weakness, or syncope.  + subjective warmth.  No photophobia or neck stiffness.  No diarrhea, urinary sx, cough, rhinorrhea.  No recent travel or time in the woods.  On screening labs, she was found to be leukopenic and thrombocytopenic with high monocytes.  ID and heme consulted, recs are pending.  Sent coags, fibrinogen, LDH.  Patient's HA has resolved after receiving tylenol, toradol, reglan.  Upreg neg.  On exam, HDS, NAD, lungs CTAB, heart sounds normal, abd benign, LEs without edema, AAOx3, PERRLA, EOMIB, CN 2-12 intact, 5/5 strength, SILT, no drift, neck supple, no photophobia.  Will admit to CDU for monitoring, repeat labs, heme and ID consults. Rogerio heme, still needs to review blood smear.  and will be down with final plan. SEen by ID started on doxy, waiting on smear, trend cbc. Sw heme, smear revealed atypical lymphs.  Likely viral and can fu with her pcp for repeat labs.   Can add on HIV, CMV, EBV, Hep panel as per heme. sw inf dz, can keep on doxy 100 BID for ten days,  until ro tick bourne illness- added on lyme, babesia, ehrlichia. Pt can fu in ID clinic to see dr mott in 1 week.  Aware pt will stay in CDU over night to monitor labs, they will see pt in AM . Sw heme, smear revealed atypical lymphs.  Likely viral and can fu with her pcp for repeat labs.   Can add on HIV, CMV, EBV, Hep panel as per heme.  Aware will keep pt over night to monitor labs, CDU team can fu with them tomorrow when lab results final.

## 2018-07-25 VITALS
RESPIRATION RATE: 16 BRPM | HEART RATE: 100 BPM | SYSTOLIC BLOOD PRESSURE: 97 MMHG | DIASTOLIC BLOOD PRESSURE: 65 MMHG | OXYGEN SATURATION: 100 % | TEMPERATURE: 99 F

## 2018-07-25 LAB
ALBUMIN SERPL ELPH-MCNC: 3.4 G/DL — SIGNIFICANT CHANGE UP (ref 3.3–5)
ALP SERPL-CCNC: 56 U/L — SIGNIFICANT CHANGE UP (ref 40–120)
ALT FLD-CCNC: 18 U/L — SIGNIFICANT CHANGE UP (ref 4–33)
APPEARANCE UR: SIGNIFICANT CHANGE UP
AST SERPL-CCNC: 24 U/L — SIGNIFICANT CHANGE UP (ref 4–32)
B BURGDOR C6 AB SER-ACNC: NEGATIVE — SIGNIFICANT CHANGE UP
B BURGDOR IGG+IGM SER-ACNC: 0.06 INDEX — SIGNIFICANT CHANGE UP (ref 0.01–0.89)
BACTERIA # UR AUTO: SIGNIFICANT CHANGE UP
BASE EXCESS BLDV CALC-SCNC: -2.1 MMOL/L — SIGNIFICANT CHANGE UP
BASOPHILS # BLD AUTO: 0.01 K/UL — SIGNIFICANT CHANGE UP (ref 0–0.2)
BASOPHILS NFR BLD AUTO: 0.5 % — SIGNIFICANT CHANGE UP (ref 0–2)
BILIRUB SERPL-MCNC: < 0.2 MG/DL — LOW (ref 0.2–1.2)
BILIRUB UR-MCNC: NEGATIVE — SIGNIFICANT CHANGE UP
BLOOD GAS VENOUS - CREATININE: 0.49 MG/DL — LOW (ref 0.5–1.3)
BLOOD UR QL VISUAL: NEGATIVE — SIGNIFICANT CHANGE UP
BUN SERPL-MCNC: 6 MG/DL — LOW (ref 7–23)
CALCIUM SERPL-MCNC: 7.9 MG/DL — LOW (ref 8.4–10.5)
CHLORIDE BLDV-SCNC: 106 MMOL/L — SIGNIFICANT CHANGE UP (ref 96–108)
CHLORIDE SERPL-SCNC: 105 MMOL/L — SIGNIFICANT CHANGE UP (ref 98–107)
CMV DNA CSF QL NAA+PROBE: NOT DETECTED IU/ML — SIGNIFICANT CHANGE UP
CMV DNA SPEC NAA+PROBE-LOG#: SIGNIFICANT CHANGE UP LOGIU/ML
CO2 SERPL-SCNC: 23 MMOL/L — SIGNIFICANT CHANGE UP (ref 22–31)
COLOR SPEC: SIGNIFICANT CHANGE UP
CREAT SERPL-MCNC: 0.55 MG/DL — SIGNIFICANT CHANGE UP (ref 0.5–1.3)
EBV EA AB TITR SER IF: POSITIVE — SIGNIFICANT CHANGE UP
EBV EA IGG SER-ACNC: NEGATIVE — SIGNIFICANT CHANGE UP
EBV PATRN SPEC IB-IMP: SIGNIFICANT CHANGE UP
EBV VCA IGG AVIDITY SER QL IA: POSITIVE — SIGNIFICANT CHANGE UP
EBV VCA IGM TITR FLD: NEGATIVE — SIGNIFICANT CHANGE UP
EOSINOPHIL # BLD AUTO: 0.15 K/UL — SIGNIFICANT CHANGE UP (ref 0–0.5)
EOSINOPHIL NFR BLD AUTO: 7.2 % — HIGH (ref 0–6)
GAS PNL BLDV: 134 MMOL/L — LOW (ref 136–146)
GLUCOSE BLDV-MCNC: 93 — SIGNIFICANT CHANGE UP (ref 70–99)
GLUCOSE SERPL-MCNC: 102 MG/DL — HIGH (ref 70–99)
GLUCOSE UR-MCNC: NEGATIVE — SIGNIFICANT CHANGE UP
HAV IGM SER-ACNC: NONREACTIVE — SIGNIFICANT CHANGE UP
HBV CORE IGM SER-ACNC: NONREACTIVE — SIGNIFICANT CHANGE UP
HBV SURFACE AG SER-ACNC: NONREACTIVE — SIGNIFICANT CHANGE UP
HCO3 BLDV-SCNC: 22 MMOL/L — SIGNIFICANT CHANGE UP (ref 20–27)
HCT VFR BLD CALC: 36.8 % — SIGNIFICANT CHANGE UP (ref 34.5–45)
HCT VFR BLDV CALC: 40 % — SIGNIFICANT CHANGE UP (ref 34.5–45)
HCV AB S/CO SERPL IA: 0.17 S/CO — SIGNIFICANT CHANGE UP
HCV AB SERPL-IMP: SIGNIFICANT CHANGE UP
HGB BLD-MCNC: 12.7 G/DL — SIGNIFICANT CHANGE UP (ref 11.5–15.5)
HGB BLDV-MCNC: 13 G/DL — SIGNIFICANT CHANGE UP (ref 11.5–15.5)
HIV-1 VIRAL LOAD RESULT: SIGNIFICANT CHANGE UP
HIV1 RNA # SERPL NAA+PROBE: SIGNIFICANT CHANGE UP
HIV1 RNA SER-IMP: SIGNIFICANT CHANGE UP
HIV1 RNA SERPL NAA+PROBE-ACNC: SIGNIFICANT CHANGE UP
HIV1 RNA SERPL NAA+PROBE-LOG#: SIGNIFICANT CHANGE UP
IMM GRANULOCYTES # BLD AUTO: 0.01 # — SIGNIFICANT CHANGE UP
IMM GRANULOCYTES NFR BLD AUTO: 0.5 % — SIGNIFICANT CHANGE UP (ref 0–1.5)
KETONES UR-MCNC: NEGATIVE — SIGNIFICANT CHANGE UP
LACTATE BLDV-MCNC: 1.2 MMOL/L — SIGNIFICANT CHANGE UP (ref 0.5–2)
LEUKOCYTE ESTERASE UR-ACNC: SIGNIFICANT CHANGE UP
LYMPHOCYTES # BLD AUTO: 0.59 K/UL — LOW (ref 1–3.3)
LYMPHOCYTES # BLD AUTO: 28.2 % — SIGNIFICANT CHANGE UP (ref 13–44)
MCHC RBC-ENTMCNC: 29.7 PG — SIGNIFICANT CHANGE UP (ref 27–34)
MCHC RBC-ENTMCNC: 34.5 % — SIGNIFICANT CHANGE UP (ref 32–36)
MCV RBC AUTO: 86.2 FL — SIGNIFICANT CHANGE UP (ref 80–100)
MONOCYTES # BLD AUTO: 0.23 K/UL — SIGNIFICANT CHANGE UP (ref 0–0.9)
MONOCYTES NFR BLD AUTO: 11 % — SIGNIFICANT CHANGE UP (ref 2–14)
MUCOUS THREADS # UR AUTO: SIGNIFICANT CHANGE UP
NEUTROPHILS # BLD AUTO: 1.1 K/UL — LOW (ref 1.8–7.4)
NEUTROPHILS NFR BLD AUTO: 52.6 % — SIGNIFICANT CHANGE UP (ref 43–77)
NITRITE UR-MCNC: NEGATIVE — SIGNIFICANT CHANGE UP
NON-SQ EPI CELLS # UR AUTO: <1 — SIGNIFICANT CHANGE UP
NRBC # FLD: 0 — SIGNIFICANT CHANGE UP
PCO2 BLDV: 41 MMHG — SIGNIFICANT CHANGE UP (ref 41–51)
PH BLDV: 7.36 PH — SIGNIFICANT CHANGE UP (ref 7.32–7.43)
PH UR: 6.5 — SIGNIFICANT CHANGE UP (ref 4.6–8)
PLATELET # BLD AUTO: 76 K/UL — LOW (ref 150–400)
PMV BLD: 10.5 FL — SIGNIFICANT CHANGE UP (ref 7–13)
PO2 BLDV: 50 MMHG — HIGH (ref 35–40)
POTASSIUM BLDV-SCNC: 3.6 MMOL/L — SIGNIFICANT CHANGE UP (ref 3.4–4.5)
POTASSIUM SERPL-MCNC: 3.9 MMOL/L — SIGNIFICANT CHANGE UP (ref 3.5–5.3)
POTASSIUM SERPL-SCNC: 3.9 MMOL/L — SIGNIFICANT CHANGE UP (ref 3.5–5.3)
PROT SERPL-MCNC: 6.1 G/DL — SIGNIFICANT CHANGE UP (ref 6–8.3)
PROT UR-MCNC: NEGATIVE MG/DL — SIGNIFICANT CHANGE UP
RBC # BLD: 4.27 M/UL — SIGNIFICANT CHANGE UP (ref 3.8–5.2)
RBC # FLD: 11.6 % — SIGNIFICANT CHANGE UP (ref 10.3–14.5)
RBC CASTS # UR COMP ASSIST: HIGH (ref 0–?)
SAO2 % BLDV: 84.7 % — SIGNIFICANT CHANGE UP (ref 60–85)
SODIUM SERPL-SCNC: 138 MMOL/L — SIGNIFICANT CHANGE UP (ref 135–145)
SP GR SPEC: 1.01 — SIGNIFICANT CHANGE UP (ref 1–1.04)
SQUAMOUS # UR AUTO: SIGNIFICANT CHANGE UP
UROBILINOGEN FLD QL: NORMAL MG/DL — SIGNIFICANT CHANGE UP
WBC # BLD: 2.09 K/UL — LOW (ref 3.8–10.5)
WBC # FLD AUTO: 2.09 K/UL — LOW (ref 3.8–10.5)
WBC UR QL: SIGNIFICANT CHANGE UP (ref 0–?)

## 2018-07-25 PROCEDURE — 71046 X-RAY EXAM CHEST 2 VIEWS: CPT | Mod: 26

## 2018-07-25 PROCEDURE — 76705 ECHO EXAM OF ABDOMEN: CPT | Mod: 26

## 2018-07-25 PROCEDURE — 99217: CPT

## 2018-07-25 RX ORDER — FAMOTIDINE 10 MG/ML
20 INJECTION INTRAVENOUS ONCE
Qty: 0 | Refills: 0 | Status: COMPLETED | OUTPATIENT
Start: 2018-07-25 | End: 2018-07-25

## 2018-07-25 RX ORDER — ONDANSETRON 8 MG/1
4 TABLET, FILM COATED ORAL ONCE
Qty: 0 | Refills: 0 | Status: COMPLETED | OUTPATIENT
Start: 2018-07-25 | End: 2018-07-25

## 2018-07-25 RX ORDER — ACETAMINOPHEN 500 MG
650 TABLET ORAL ONCE
Qty: 0 | Refills: 0 | Status: COMPLETED | OUTPATIENT
Start: 2018-07-25 | End: 2018-07-25

## 2018-07-25 RX ADMIN — ONDANSETRON 4 MILLIGRAM(S): 8 TABLET, FILM COATED ORAL at 07:58

## 2018-07-25 RX ADMIN — SODIUM CHLORIDE 125 MILLILITER(S): 9 INJECTION INTRAMUSCULAR; INTRAVENOUS; SUBCUTANEOUS at 06:32

## 2018-07-25 RX ADMIN — Medication 100 MILLIGRAM(S): at 06:32

## 2018-07-25 RX ADMIN — FAMOTIDINE 20 MILLIGRAM(S): 10 INJECTION INTRAVENOUS at 08:00

## 2018-07-25 RX ADMIN — Medication 650 MILLIGRAM(S): at 14:57

## 2018-07-25 RX ADMIN — Medication 650 MILLIGRAM(S): at 06:00

## 2018-07-25 RX ADMIN — SODIUM CHLORIDE 1000 MILLILITER(S): 9 INJECTION INTRAMUSCULAR; INTRAVENOUS; SUBCUTANEOUS at 14:57

## 2018-07-25 RX ADMIN — Medication 650 MILLIGRAM(S): at 05:29

## 2018-07-25 NOTE — ED CDU PROVIDER SUBSEQUENT DAY NOTE - ATTENDING CONTRIBUTION TO CARE
I performed a face-to-face evaluation of the patient and performed a history and physical examination. I agree with the history and physical examination.    Bug bite a few days ago f/b HA, neck pain, F. Found to have leukopenia and thrombocytopenia. This AM, vomited after eating Doxycycline on empty stomach. Now c/o RUQ pain. Platelets continue to downtrend slowly. DDx: parasitic, ricketsial or atypical bacteria, viral. Plan: anti-emetics, trend labs, get CXR and UA, contact ID.

## 2018-07-25 NOTE — ED CDU PROVIDER SUBSEQUENT DAY NOTE - MEDICAL DECISION MAKING DETAILS
A: 21yo F w/ HA - now resolved- leukopenia  -ID/Hematology consulted  -Repeat labs  -F/u on lab work drawn yesterday

## 2018-07-25 NOTE — ED CDU PROVIDER DISPOSITION NOTE - CLINICAL COURSE
Yas: Bug bite a few days ago f/b HA, neck pain, F. Found to have leukopenia and thrombocytopenia. This AM, vomited after eating Doxycycline on empty stomach. CXR and UA and RUQ US neg. Platelets continue to downtrend, though slowly. DDx: parasitic, ricketsial or atypical bacteria, viral. Improved w/ anti-emetics and anti-pyretics. Continue Doxy. F/u w/ ID.

## 2018-07-25 NOTE — ED CDU PROVIDER SUBSEQUENT DAY NOTE - HISTORY
22F with no PMH admitted to the CDU received from ED c/o 2 days of R frontal HA, gradual onset, no N/V, vision changes, numbness, weakness, or syncope. + subjective warmth, no documented fevers, no subjective chills/rigors. Denies blurred vision, photophobia or neck stiffness.  No diarrhea, urinary sx, cough, rhinorrhea.  No recent travel or time in the woods.  Pt was consulted by ID and lilly Patient's HA has resolved after receiving tylenol, toradol, reglan.  Upreg neg. Pending repeat labs, lilly and ID consults.

## 2018-07-26 LAB
B MICROTI IGG TITR SER: SIGNIFICANT CHANGE UP
B MICROTI IGM TITR SER: SIGNIFICANT CHANGE UP

## 2018-08-16 LAB
B MICROTI IGG+IGM PNL SER IF: SIGNIFICANT CHANGE UP
BABESIA RESULT COMMENT: SIGNIFICANT CHANGE UP
EHRLICHIA AB TITR SER: SIGNIFICANT CHANGE UP

## 2018-12-25 ENCOUNTER — EMERGENCY (EMERGENCY)
Facility: HOSPITAL | Age: 23
LOS: 1 days | End: 2018-12-25
Attending: EMERGENCY MEDICINE
Payer: MEDICAID

## 2018-12-25 ENCOUNTER — EMERGENCY (EMERGENCY)
Facility: HOSPITAL | Age: 23
LOS: 1 days | Discharge: HOME | End: 2018-12-25
Admitting: EMERGENCY MEDICINE
Payer: MEDICAID

## 2018-12-25 VITALS
HEART RATE: 91 BPM | OXYGEN SATURATION: 100 % | SYSTOLIC BLOOD PRESSURE: 110 MMHG | DIASTOLIC BLOOD PRESSURE: 72 MMHG | TEMPERATURE: 98 F | RESPIRATION RATE: 16 BRPM

## 2018-12-25 VITALS
SYSTOLIC BLOOD PRESSURE: 114 MMHG | HEIGHT: 60 IN | DIASTOLIC BLOOD PRESSURE: 82 MMHG | OXYGEN SATURATION: 99 % | WEIGHT: 104.94 LBS | HEART RATE: 99 BPM | RESPIRATION RATE: 16 BRPM | TEMPERATURE: 99 F

## 2018-12-25 DIAGNOSIS — H92.09 OTALGIA, UNSPECIFIED EAR: ICD-10-CM

## 2018-12-25 PROCEDURE — 82962 GLUCOSE BLOOD TEST: CPT

## 2018-12-25 PROCEDURE — 99283 EMERGENCY DEPT VISIT LOW MDM: CPT | Mod: 25

## 2018-12-25 PROCEDURE — 99283 EMERGENCY DEPT VISIT LOW MDM: CPT

## 2018-12-25 RX ORDER — OFLOXACIN 0.3 %
5 DROPS OPHTHALMIC (EYE) ONCE
Qty: 0 | Refills: 0 | Status: COMPLETED | OUTPATIENT
Start: 2018-12-25 | End: 2018-12-25

## 2018-12-25 RX ORDER — OFLOXACIN 0.3 %
1 DROPS OPHTHALMIC (EYE) ONCE
Qty: 0 | Refills: 0 | Status: DISCONTINUED | OUTPATIENT
Start: 2018-12-25 | End: 2018-12-25

## 2018-12-25 RX ORDER — ACETAMINOPHEN 500 MG
975 TABLET ORAL ONCE
Qty: 0 | Refills: 0 | Status: COMPLETED | OUTPATIENT
Start: 2018-12-25 | End: 2018-12-25

## 2018-12-25 RX ADMIN — Medication 5 DROP(S): at 03:28

## 2018-12-25 RX ADMIN — Medication 975 MILLIGRAM(S): at 03:27

## 2018-12-25 NOTE — ED PROVIDER NOTE - NSFOLLOWUPINSTRUCTIONS_ED_ALL_ED_FT
pls use your abx ear drops as instructed  rest, tylenol/motrin fo rpain  f/u with pmd  return for any worseing symptoms or any other concerning symptoms

## 2018-12-25 NOTE — CONSULT NOTE ADULT - SUBJECTIVE AND OBJECTIVE BOX
CC: bleeding from right ear and pain     HPI:   24 y/o F pt with no significant PMHx c/o sudden onset worsening constant stabbing right ear pain. Notes ear bleeding this morning. States that she was at [facility] ED this morning at 2am and was given ear drops. Notes that since she went home, she started bleeding from the ear. States that she hears a cracking and wind like sounds. Notes that she can't hear as well in her right ear. Also notes vomiting this morning. 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 Denies tinnitus, hx of sx, recent uri, fever, trauma or any other complaints.    PAST MEDICAL & SURGICAL HISTORY:    Allergies    No Known Allergies    Intolerances      MEDICATIONS  (STANDING):    MEDICATIONS  (PRN):      Social History: no tobacco, no etoh     Family history: Pt denies any sign FHx    ROS:   ENT: all negative except as noted in HPI   CV: denies palpitations  Pulm: denies SOB, cough, hemoptysis  GI: denies change in apetite, indigestion, n/v  : denies pertinent urinary symptoms, urgency  Neuro: denies numbness/tingling, loss of sensation  Psych: denies anxiety  MS: denies muscle weakness, instability  Heme: denies easy bruising or bleeding  Endo: denies heat/cold intolerance, excessive sweating  Vascular: denies LE edema    Vital Signs Last 24 Hrs  T(C): 37 (25 Dec 2018 19:07), Max: 37 (25 Dec 2018 19:07)  T(F): 98.6 (25 Dec 2018 19:07), Max: 98.6 (25 Dec 2018 19:07)  HR: 99 (25 Dec 2018 19:07) (99 - 99)  BP: 114/82 (25 Dec 2018 19:07) (114/82 - 114/82)  BP(mean): --  RR: 16 (25 Dec 2018 19:07) (16 - 16)  SpO2: 99% (25 Dec 2018 19:07) (99% - 99%)              PHYSICAL EXAM:  Gen: NAD  Skin: No rashes, bruises, or lesions  Head: Normocephalic, Atraumatic  Face: no edema, erythema, or fluctuance. Parotid glands soft without mass  Eyes: no scleral injection  Ears: Right - ear canal with blood, suctioned, unable to clearly id TM, no active source of bleeding noted.  No evidence of any fluid drainage. No mastoid tenderness, erythema, or ear bulging            Left - ear canal clear, TM intact without effusion or erythema. No evidence of any fluid drainage. No mastoid tenderness, erythema, or ear bulging  Nose: Nares bilaterally patent, no discharge  Mouth: No Stridor / Drooling / Trismus.  Mucosa moist, tongue/uvula midline, oropharynx clear  Neck: Flat, supple, no lymphadenopathy, trachea midline, no masses  Lymphatic: No lymphadenopathy  Resp: breathing easily, no stridor  CV: no peripheral edema/cyanosis  GI: nondistended   Peripheral vascular: no JVD or edema  Neuro: facial nerve intact, no facial droop

## 2018-12-25 NOTE — ED ADULT TRIAGE NOTE - CHIEF COMPLAINT QUOTE
alert c/o redness and pain to right  ear started today  states she has decreased hearing in right ear

## 2018-12-25 NOTE — ED PROVIDER NOTE - OBJECTIVE STATEMENT
22 y/o female no pmh presents with c/o right ear pain x few days,no aggravating/relieving factors, has not taken any meds for her symptoms, denies cleaning it with any q-tips, inserting any objects in ear, deneis any headaches, neck pain, coughing, f/c/n/v/d, tinnitus, chest pain, sob, abdominal pain, urinary symptoms, numbness/weakness/tingling, recent travel, sick contact, social history, c/o mild difficuilty hearing from right ear

## 2018-12-25 NOTE — ED PROVIDER NOTE - OBJECTIVE STATEMENT
Shiva Demarco) : 22 y/o F pt with no significant PMHx c/o sudden onset worsening constant stabbing right ear pain. Notes ear bleeding this morning. States that she was at [facility] ED this morning at 2am and was given ear drops. Notes that since she went home, she started bleeding from the ear. States that she hears a cracking and wind like sounds. Notes that she can't hear as well in her right ear. Also notes vomiting this morning. 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 Denies fever, trauma or any other complaints. Current medications: OCP. NKDA

## 2018-12-25 NOTE — ED PROVIDER NOTE - RIGHT EAR
active bleeding, inflammation and edema, diminished hearing active bleeding, inflammation and edema, diminished hearing, TTP and erythema to the tragus

## 2018-12-25 NOTE — ED ADULT NURSE NOTE - OBJECTIVE STATEMENT
24 y/o F pt with no significant PMHx c/o sudden onset worsening constant stabbing right ear pain. Notes ear bleeding this morning. States that she was at [facility] ED this morning at 2am and was given ear drops. Notes that since she went home, she started bleeding from the ear. States that she hears a cracking and wind like sounds. Notes that she can't hear as well in her right ear. Also notes vomiting this morning. 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 Denies fever, trauma or any other complaints. Current medications:

## 2018-12-25 NOTE — CONSULT NOTE ADULT - PROBLEM SELECTOR RECOMMENDATION 9
Pt discussed in detail with Dr. Jaramillo, plan to continue with floxin drops  add oral abx for d/c   Pt is to follow up at [facility] ENT clinic in 1 week. Call [phone] to make appointment.

## 2018-12-25 NOTE — ED PROVIDER NOTE - NSFOLLOWUPINSTRUCTIONS_ED_ALL_ED_FT
1. Please continue with the Ear drops  2. You have been prescribed Augmentin 875; Take 1 twice a day for 10 days  3. Follow up with [facility] ENT clinic in 1 week. Call [phone] to make appointment.  4. Please come back if any of the following: Fever, continued bleeding, decreased hearing, headaches, changes in vision, nausea or vomiting.

## 2018-12-25 NOTE — ED PROVIDER NOTE - ATTENDING CONTRIBUTION TO CARE
23F, no sig pmh, presents with R ear pain, with +bloody discharge. Pt seen at [facility] ED this morning, given eardrops, since then with worsening pain, bleeding. +Cracking sound, with decreased hearing R ear. +Nausea, one episode of nbnb vomiting this morning. Denies f/c, headache, dizziness, chest pain, sob, neck pain, vision changes, or other complaints.    PE: NAD, NCAT, MMM, L TM erythematous, +light reflex, no discharge, R ear with +bloody purulence in EAC, +pain manipulation tragus, no ttp mastoid, Trachea midline, No trismus, FROM neck, Normal conjunctiva, lungs CTAB, S1/S2 RRR, Normal perfusion, 2+ radial pulses bilat, Abdomen Soft, NTND, No rebound/guarding, No LE edema, No deformity of extremities, No rashes,  No focal motor or sensory deficits.    VS without sig abnormality. Pt with worsening OE despite treatment, unable to visualize TM. no s/s systemic illness. To consult ENT, appreciate their recs. - Jem Laboy MD

## 2018-12-25 NOTE — ED PROVIDER NOTE - MEDICAL DECISION MAKING DETAILS
24 y/o F pt with no significant PMHx c/o sudden onset worsening constant stabbing right ear pain.   Plan: ENT consult 22 y/o F pt with no significant PMHx c/o sudden onset worsening constant stabbing right ear pain.   Plan: ENT consult, reassess

## 2018-12-25 NOTE — ED ADULT NURSE NOTE - NSIMPLEMENTINTERV_GEN_ALL_ED
Implemented All Universal Safety Interventions:  [place] to call system. Call bell, personal items and telephone within reach. Instruct patient to call for assistance. Room bathroom lighting operational. Non-slip footwear when patient is off stretcher. Physically safe environment: no spills, clutter or unnecessary equipment. Stretcher in lowest position, wheels locked, appropriate side rails in place.

## 2019-09-22 NOTE — ED PROVIDER NOTE - NSSUBSTANCEUSE_GEN_ALL_CORE_SD
Airway patent, Nasal mucosa clear. Mouth with normal mucosa. Throat has no vesicles, no oropharyngeal exudates and uvula is midline.
never used

## 2019-12-27 NOTE — ED PROVIDER NOTE - PMH
1.  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 The patient stated a full understanding and a desire to proceed with the procedure. The patient will need to return for preop appointment with cataract measurements and to have any additional questions answered and start pre-operative eye drops as directed. Phaco PCL OS Only. Otherwise follow-up2. PCO OD -- Visually Significant *secondary to glare*; schedule YAG Cap. Pros cons risks and benefits. 3.  Glaucoma Suspect OU -- (CD 0.700.75) IOP today 15/17. Family Hx. Disc Photo done today showing Cupping OU. Patient is considered high risk. Condition was discussed with patient and patient understands. Will do baseline OCT and HVF after Phaco.  Patient was advised to call us with any problems questions or concerns. 4.  Pseudophakia OD --  (Dr. Abner Salazar for an appointment in [address] [place] [address] Dr with Dr. Chrissy Maria. <<----- Click to add NO pertinent Past Medical History No pertinent past medical history

## 2020-01-30 NOTE — ED ADULT NURSE NOTE - CAS EDN INTEG ASSESS
New PT orders received with pt on caseload and assigned for reevaluation. Will follow up as appropriate. Acknowledged new orders.      Thank you,   Stanton Adorno, PT, DPT [facility]

## 2020-08-06 NOTE — ED ADULT NURSE NOTE - CAS EDP DISCH TYPE
Will set up for direct laryngoscopy with dilatation of esophagus     I advised the patient of the risks in continuing to use tobacco and recommended complete cessation, The inherent risks including the risk of disability, development of a malignancy and/or death was discussed.  The patient indicated understanding.    For more information:  [employer] [place]  1-800-QUIT-NOW  [email or web address]          Home

## 2025-03-17 NOTE — ED PROVIDER NOTE - NS PRO PASSIVE SMOKE EXP
Chief Complaint   Patient presents with    Follow-up     Vaginal bleeding       
months - Maintain menstrual calendar               --> Reviewed the growth chart with the family    Total time with patient 35 minutes  Time spent counseling patient more than 50%   
No